# Patient Record
Sex: MALE | URBAN - METROPOLITAN AREA
[De-identification: names, ages, dates, MRNs, and addresses within clinical notes are randomized per-mention and may not be internally consistent; named-entity substitution may affect disease eponyms.]

---

## 2024-09-25 ENCOUNTER — OFFICE VISIT (OUTPATIENT)
Dept: URBAN - METROPOLITAN AREA CLINIC 63 | Facility: CLINIC | Age: 44
End: 2024-09-25
Payer: COMMERCIAL

## 2024-09-25 ENCOUNTER — DASHBOARD ENCOUNTERS (OUTPATIENT)
Age: 44
End: 2024-09-25

## 2024-09-25 VITALS
WEIGHT: 181 LBS | SYSTOLIC BLOOD PRESSURE: 132 MMHG | HEART RATE: 66 BPM | BODY MASS INDEX: 25.34 KG/M2 | OXYGEN SATURATION: 98 % | HEIGHT: 71 IN | TEMPERATURE: 97.4 F | DIASTOLIC BLOOD PRESSURE: 82 MMHG

## 2024-09-25 DIAGNOSIS — Z12.11 COLON CANCER SCREENING: ICD-10-CM

## 2024-09-25 DIAGNOSIS — Z80.0 FAMILY HISTORY OF COLORECTAL CANCER: ICD-10-CM

## 2024-09-25 PROCEDURE — 99204 OFFICE O/P NEW MOD 45 MIN: CPT

## 2024-09-25 NOTE — HPI-PREVIOUS LABS
Labs dated 8/16/2024 Lipid panel - HDL cholesterol 30 - Triglycerides 186 - Rest within normal limits . CMP - Glucose 109 - Rest within normal limits . Hemoglobin A1c - 5.6 . TSH - Within normal limits . CBC - Within normal limits . HIV 1/2 antigen/antibody - Nonreactive . Hepatitis B surface antigen with reflex to confirm - Nonreactive . Hepatitis C antibody with reflex to HCV RNA - Nonreactive

## 2024-09-25 NOTE — HPI-TODAY'S VISIT:
This is a very pleasant 44-year-old male who presents to the office with a chief complaint of colonoscopy screening.  Past medical history is significant for hypertension, tobacco use,.  Past surgical history is significant for colonoscopy. Family history is significant for  paternal CRC, diagnosed at age 45. Last colonoscopy in 2012, in [place], had polyps, had a 5-year recall, per patient. We do not have these records. Patient is endoscopy naive. Cardiologist: none. . Patient presents today doing well and without GI complaint. He explains he has a family history of CRC in his father who was diagnosed at age 45 and passed about 1 year after diagnoses. I enjoyed a lengthy conversation with the patient regarding the guidelines on his 5-year recall and the importance of adherence due to him explaining he had colon polyps removed at his first colonoscopy at age 32. Ordered surveillance colonoscopy today. . Patient denies abdominal pain, belching, bloating, constipation, diarrhea, dysphagia, pyrosis, melena, hematochezia, hematemesis, nausea, vomiting, BRBPR, and unintentional weight loss.

## 2024-10-02 ENCOUNTER — LAB OUTSIDE AN ENCOUNTER (OUTPATIENT)
Dept: URBAN - METROPOLITAN AREA CLINIC 63 | Facility: CLINIC | Age: 44
End: 2024-10-02

## 2025-01-02 ENCOUNTER — OFFICE VISIT (OUTPATIENT)
Dept: URBAN - METROPOLITAN AREA SURGERY CENTER 4 | Facility: SURGERY CENTER | Age: 45
End: 2025-01-02

## 2025-01-17 ENCOUNTER — OFFICE VISIT (OUTPATIENT)
Dept: URBAN - METROPOLITAN AREA CLINIC 63 | Facility: CLINIC | Age: 45
End: 2025-01-17